# Patient Record
Sex: FEMALE | Race: WHITE | NOT HISPANIC OR LATINO | Employment: UNEMPLOYED | URBAN - METROPOLITAN AREA
[De-identification: names, ages, dates, MRNs, and addresses within clinical notes are randomized per-mention and may not be internally consistent; named-entity substitution may affect disease eponyms.]

---

## 2023-03-02 ENCOUNTER — MEDICAL CORRESPONDENCE (OUTPATIENT)
Dept: HEALTH INFORMATION MANAGEMENT | Facility: CLINIC | Age: 28
End: 2023-03-02
Payer: COMMERCIAL

## 2023-03-25 ENCOUNTER — TRANSCRIBE ORDERS (OUTPATIENT)
Dept: OTHER | Age: 28
End: 2023-03-25

## 2023-03-25 DIAGNOSIS — M25.562 CHRONIC PAIN OF LEFT KNEE: Primary | ICD-10-CM

## 2023-03-25 DIAGNOSIS — Z87.39 STATUS POST RECONSTRUCTION OF MEDIAL PATELLOFEMORAL LIGAMENT: ICD-10-CM

## 2023-03-25 DIAGNOSIS — G89.29 CHRONIC PAIN OF LEFT KNEE: Primary | ICD-10-CM

## 2023-03-25 DIAGNOSIS — Z98.890 STATUS POST RECONSTRUCTION OF MEDIAL PATELLOFEMORAL LIGAMENT: ICD-10-CM

## 2023-03-25 DIAGNOSIS — M25.562 PATELLOFEMORAL ARTHRALGIA OF LEFT KNEE: ICD-10-CM

## 2023-05-15 NOTE — TELEPHONE ENCOUNTER
Images Received    July 12, 2023 9:42 AM  [id number]   Facility  [facility]    Outcome Images requested on 5/16 are in PACS from both facilities - [facility]        Action May 16, 2023 2:15 PM MT   Action Taken Sent a request for imaging from [facility].     DIAGNOSIS: LT Knee   APPOINTMENT DATE: 05/23/2023   NOTES STATUS DETAILS   OFFICE NOTE from referring provider Care Everywhere 03/02/2023 - Sharad Tran - [facility]   OFFICE NOTE from other specialist Care Everywhere 02/19/2020 - Hang Powell MD - [facility]    12/27/2019 - Randell Allen MD - [facility]    More..     DISCHARGE REPORT from the ER Care Everywhere 11/06/2019 - [facility]   OPERATIVE REPORT Care Everywhere 08/05/2019 - Left Knee MPFL Reconstruction   MEDICATION LIST Internal  Care Everywhere    IMPLANT RECORD/STICKER Care Everywhere    LABS     MRI In process - received  [facility]:  02/27/2023, 07/02/2019, 02/05/2019, 12/07/2015 - LT Knee  11/15/2019 - LT Femur    [facility]:  12/20/2019 - LT Knee   XRAYS (IMAGES & REPORTS) In process - received  [facility]:  02/17/2023, 06/15/2020, 11/06/2019, 02/05/2019 - LT Knee  11/06/2019 - LT Hip  08/05/2019 - [facility]    [facility]:  10/29/2019 - LT Knee

## 2023-05-23 ENCOUNTER — PRE VISIT (OUTPATIENT)
Dept: ORTHOPEDICS | Facility: CLINIC | Age: 28
End: 2023-05-23

## 2023-07-14 DIAGNOSIS — M25.362 PATELLAR INSTABILITY OF LEFT KNEE: Primary | ICD-10-CM

## 2023-07-18 ENCOUNTER — OFFICE VISIT (OUTPATIENT)
Dept: ORTHOPEDICS | Facility: CLINIC | Age: 28
End: 2023-07-18
Payer: COMMERCIAL

## 2023-07-18 ENCOUNTER — ANCILLARY PROCEDURE (OUTPATIENT)
Dept: GENERAL RADIOLOGY | Facility: CLINIC | Age: 28
End: 2023-07-18
Attending: ORTHOPAEDIC SURGERY
Payer: COMMERCIAL

## 2023-07-18 VITALS — HEIGHT: 66 IN | WEIGHT: 293 LBS | BODY MASS INDEX: 47.09 KG/M2

## 2023-07-18 DIAGNOSIS — G89.29 CHRONIC PAIN OF LEFT KNEE: ICD-10-CM

## 2023-07-18 DIAGNOSIS — Z87.39 STATUS POST RECONSTRUCTION OF MEDIAL PATELLOFEMORAL LIGAMENT: ICD-10-CM

## 2023-07-18 DIAGNOSIS — M25.362 PATELLAR INSTABILITY OF LEFT KNEE: ICD-10-CM

## 2023-07-18 DIAGNOSIS — Z98.890 STATUS POST RECONSTRUCTION OF MEDIAL PATELLOFEMORAL LIGAMENT: ICD-10-CM

## 2023-07-18 DIAGNOSIS — M25.562 PATELLOFEMORAL ARTHRALGIA OF LEFT KNEE: Primary | ICD-10-CM

## 2023-07-18 DIAGNOSIS — M25.562 CHRONIC PAIN OF LEFT KNEE: ICD-10-CM

## 2023-07-18 PROCEDURE — 73560 X-RAY EXAM OF KNEE 1 OR 2: CPT | Mod: LT | Performed by: RADIOLOGY

## 2023-07-18 PROCEDURE — 77073 BONE LENGTH STUDIES: CPT | Performed by: STUDENT IN AN ORGANIZED HEALTH CARE EDUCATION/TRAINING PROGRAM

## 2023-07-18 PROCEDURE — 99203 OFFICE O/P NEW LOW 30 MIN: CPT | Mod: 25 | Performed by: ORTHOPAEDIC SURGERY

## 2023-07-18 PROCEDURE — 20610 DRAIN/INJ JOINT/BURSA W/O US: CPT | Mod: LT | Performed by: ORTHOPAEDIC SURGERY

## 2023-07-18 RX ORDER — SUMATRIPTAN 25 MG/1
TABLET, FILM COATED ORAL
COMMUNITY
Start: 2023-04-13

## 2023-07-18 RX ORDER — IBUPROFEN 600 MG/1
TABLET, FILM COATED ORAL
COMMUNITY
Start: 2023-04-14

## 2023-07-18 RX ORDER — DEXTROAMPHETAMINE SACCHARATE, AMPHETAMINE ASPARTATE, DEXTROAMPHETAMINE SULFATE AND AMPHETAMINE SULFATE 5; 5; 5; 5 MG/1; MG/1; MG/1; MG/1
1 TABLET ORAL
COMMUNITY
Start: 2023-07-15

## 2023-07-18 RX ORDER — OXYCODONE HYDROCHLORIDE 10 MG/1
TABLET ORAL
COMMUNITY
Start: 2023-07-14

## 2023-07-18 RX ORDER — BACLOFEN 10 MG/1
TABLET ORAL
COMMUNITY
Start: 2022-10-28

## 2023-07-18 RX ADMIN — LIDOCAINE HYDROCHLORIDE 9 ML: 10 INJECTION, SOLUTION EPIDURAL; INFILTRATION; INTRACAUDAL; PERINEURAL at 13:31

## 2023-07-18 RX ADMIN — TRIAMCINOLONE ACETONIDE 40 MG: 40 INJECTION, SUSPENSION INTRA-ARTICULAR; INTRAMUSCULAR at 13:31

## 2023-07-18 ASSESSMENT — ENCOUNTER SYMPTOMS
ARTHRALGIAS: 1
DEPRESSION: 1
JOINT SWELLING: 1
DECREASED CONCENTRATION: 1
NERVOUS/ANXIOUS: 1
INSOMNIA: 0
PANIC: 1
STIFFNESS: 1
MUSCLE CRAMPS: 1
MUSCLE WEAKNESS: 1
NECK PAIN: 1
MYALGIAS: 1
BACK PAIN: 1

## 2023-07-18 NOTE — LETTER
2023         RE: Joanne Phillips  [address] [facility] [address]        Dear Colleague,    Thank you for referring your patient, Joanne Phillips, to the [facility]. Please see a copy of my visit note below.    This is 1 Large Joint Injection/Arthocentesis: L knee joint    Date/Time: 2023 1:31 PM    Performed by: Dana Pelayo MD  Authorized by: Dana Pelayo MD    Indications:  Pain and osteoarthritis  Needle Size:  21 G  Guidance: landmark guided    Approach:  Medial  Location:  Knee      Medications:  40 mg triamcinolone 40 MG/ML; 9 mL lidocaine (PF) 1 %  Outcome:  Tolerated well, no immediate complications  Procedure discussed: discussed risks, benefits, and alternatives    Consent Given by:  Patient  Timeout: timeout called immediately prior to procedure    Prep: patient was prepped and draped in usual sterile fashion     HISTORY OF PRESENT ILLNESS:  Joanne Phillips is a 27 year old female with knee pain.  Diagnosis is knee arthritis supported by history, physical exam, and imaging.    PROCEDURE:  After informed verbal and writtten consent, under sterile conditions, patient's left knee was injected with 9 cc of Lidocaine and 1 cc of Kenalog (40 mg/ml).   The injection was done by Dr. Pelayo.    Patient had good pain relief upon leaving the clinic, and will follow up with us on an as needed basis.        [facility] [address] [id number] [phone]  Dept: [phone]  ______________________________________________________________________________    Patient: Joanne Phillips   : 1995   MRN: [id number]   2023    INVASIVE PROCEDURE SAFETY CHECKLIST    Date: 2023   Procedure:left knee steroid knee injection  Patient Name: Joanne Phillips  MRN: [id number]  YOB: 1995    Action: Complete sections as appropriate. Any discrepancy  results in a HARD COPY until resolved.     PRE PROCEDURE:  Patient ID verified with 2 identifiers (name and  or MRN): Yes  Procedure and site verified with patient/designee (when able): Yes  Accurate consent documentation in medical record: Yes  H&P (or appropriate assessment) documented in medical record: NA  H&P must be up to 20 days prior to procedure and updates within 24 hours of procedure as applicable: NA  Relevant diagnostic and radiology test results appropriately labeled and displayed as applicable: Yes  Procedure site(s) marked with provider initials: NA    TIMEOUT:  Time-Out performed immediately prior to starting procedure, including verbal and active participation of all team members addressing the following:Yes  * Correct patient identify  * Confirmed that the correct side and site are marked  * An accurate procedure consent form  * Agreement on the procedure to be done  * Correct patient position  * Relevant images and results are properly labeled and appropriately displayed  * The need to administer antibiotics or fluids for irrigation purposes during the procedure as applicable   * Safety precautions based on patient history or medication use    DURING PROCEDURE: Verification of correct person, site, and procedures any time the responsibility for care of the patient is transferred to another member of the care team.       The following medications were given:         Prior to injection, verified patient identity using patient's name and date of birth.  Due to injection administration, patient instructed to remain in clinic for 15 minutes  afterwards, and to report any adverse reaction to me immediately.    Joint injection was performed.    Medication Name: Lidocaine NDC 85869-505-37  Drug Amount Wasted:  Yes: 11 mg/ml   Vial/Syringe: Single dose vial  Expiration Date:  2025    Medication Name Kenlog NDC [address]-2    Scribed by Maggie Griggs ATC for Dr. Pelayo on 2023 at 1:48p based  on the provider's statements to me.     MALENA Veloz MD  Professor Orthopedic Surgery  [facility]          Patient is a 27-year-old female who presents with a primary diagnosis of left knee pain.  She referred by Carlos Tran PA-C.  She is present with her 2 children.    She gives a history of having recurrent patella instability episodes.  She was treated by Dr. Triston Lopez at [facility].    DOS 8/5/219: MPFL reconstruction with patella chondroplasty.      She was doing reasonably well until November 2022 when she was running and tripped up on a snack in the rug.  She hit her left ankle and hip.  She has had pain and a sense of instability in her knee since that time.    She lives with her 2 children and .  She does not work but is going to school for [employer].  She has had significant weight gain in the recent past but has always been a large woman.  She estimates her lowest weight as an adult to being approximately 230, when she started the current bariatric program she was 308 pounds.  She is [place] in the [employer] system to have bariatric surgery.  For this she has to have several sessions with the dietitian with whom she meets monthly.  She has 2 more visits to go before decision is made as to what is the next step.    She is here for further discussion in regards to her left knee.    Physical exam reveals a woman of large body size, BMI compute to 48    Examination of patient's left knee reveals well-healed surgical incisions.  Range of motion 0-1 20.  Kneecap tracks well through an active arc of motion.  Mild crepitus in early flexion.  No significant swelling or synovitis.  Passive patella mobility 2 quadrants lateral mobility with a firm endpoint.    Hips have satisfactory ROM without pain, ER > IR.    Select imaging was reviewed.    Long-leg alignment views reveal mild valgus alignment left greater than right at 2 and 2.5 degrees.    PA view  was taken today which show satisfactory maintenance of cartilage space.  MRI taken in February 2023 shows mild cartilage signal changes at the median ridge with no significant cartilage loss.  Lateral patella tilt 18.7 degrees  LTI 2.2 degrees.  No patella ebonie    Impression: Early patellofemoral arthrosis in the face of satisfactory patella stability.    Plan: 1. continue with the plan for consideration of bariatric surgery which is currently being monitored through [facility].  2.  Left knee injection today  3.  Consideration for PT for lateral to medial tape technique.  This latter is difficult given her location and her current family and schooling situation.  However this needs to be considered for the future in regards to helping confirm the diagnosis of lateral patella overload syndrome.    Injection be separately dictated.  All questions were answered.    Dana Pelayo MD  Professor Orthopedic Surgery  [facility]       Answers submitted by the patient for this visit:  Symptoms you have experienced in the last 30 days (Submitted on 7/18/2023)  General Symptoms: No  Skin Symptoms: No  HENT Symptoms: No  EYE SYMPTOMS: No  HEART SYMPTOMS: No  LUNG SYMPTOMS: No  INTESTINAL SYMPTOMS: No  URINARY SYMPTOMS: No  GYNECOLOGIC SYMPTOMS: No  BREAST SYMPTOMS: No  SKELETAL SYMPTOMS: Yes  BLOOD SYMPTOMS: No  NERVOUS SYSTEM SYMPTOMS: No  MENTAL HEALTH SYMPTOMS: Yes  Please answer the questions below to tell us what condition you are experiencing: (Submitted on 7/18/2023)  Back pain: Yes  Muscle aches: Yes  Neck pain: Yes  Swollen joints: Yes  Joint pain: Yes  Bone pain: Yes  Muscle cramps: Yes  Muscle weakness: Yes  Joint stiffness: Yes  Bone fracture: No  Please answer the questions below to tell us what condition you are experiencing: (Submitted on 7/18/2023)  Nervous or Anxious: Yes  Depression: Yes  Trouble sleeping: No  Trouble thinking or concentrating: Yes  Mood changes: Yes  Panic attacks: Yes

## 2023-07-18 NOTE — PROGRESS NOTES
This is 1 Large Joint Injection/Arthocentesis: L knee joint    Date/Time: 2023 1:31 PM    Performed by: Dana Pelayo MD  Authorized by: Dana Pelayo MD    Indications:  Pain and osteoarthritis  Needle Size:  21 G  Guidance: landmark guided    Approach:  Medial  Location:  Knee      Medications:  40 mg triamcinolone 40 MG/ML; 9 mL lidocaine (PF) 1 %  Outcome:  Tolerated well, no immediate complications  Procedure discussed: discussed risks, benefits, and alternatives    Consent Given by:  Patient  Timeout: timeout called immediately prior to procedure    Prep: patient was prepped and draped in usual sterile fashion     HISTORY OF PRESENT ILLNESS:  Joanne Phillips is a 27 year old female with knee pain.  Diagnosis is knee arthritis supported by history, physical exam, and imaging.    PROCEDURE:  After informed verbal and writtten consent, under sterile conditions, patient's left knee was injected with 9 cc of Lidocaine and 1 cc of Kenalog (40 mg/ml).   The injection was done by Dr. Pelayo.    Patient had good pain relief upon leaving the clinic, and will follow up with us on an as needed basis.        [facility] [address] [id number] [phone]  Dept: [phone]  ______________________________________________________________________________    Patient: Joanne Phillips   : 1995   MRN: [id number]   2023    INVASIVE PROCEDURE SAFETY CHECKLIST    Date: 2023   Procedure:left knee steroid knee injection  Patient Name: Joanne Phillips  MRN: [id number]  YOB: 1995    Action: Complete sections as appropriate. Any discrepancy results in a HARD COPY until resolved.     PRE PROCEDURE:  Patient ID verified with 2 identifiers (name and  or MRN): Yes  Procedure and site verified with patient/designee (when able): Yes  Accurate consent documentation in medical record: Yes  H&P (or appropriate  assessment) documented in medical record: NA  H&P must be up to 20 days prior to procedure and updates within 24 hours of procedure as applicable: NA  Relevant diagnostic and radiology test results appropriately labeled and displayed as applicable: Yes  Procedure site(s) marked with provider initials: NA    TIMEOUT:  Time-Out performed immediately prior to starting procedure, including verbal and active participation of all team members addressing the following:Yes  * Correct patient identify  * Confirmed that the correct side and site are marked  * An accurate procedure consent form  * Agreement on the procedure to be done  * Correct patient position  * Relevant images and results are properly labeled and appropriately displayed  * The need to administer antibiotics or fluids for irrigation purposes during the procedure as applicable   * Safety precautions based on patient history or medication use    DURING PROCEDURE: Verification of correct person, site, and procedures any time the responsibility for care of the patient is transferred to another member of the care team.       The following medications were given:         Prior to injection, verified patient identity using patient's name and date of birth.  Due to injection administration, patient instructed to remain in clinic for 15 minutes  afterwards, and to report any adverse reaction to me immediately.    Joint injection was performed.    Medication Name: Lidocaine NDC 60367-152-96  Drug Amount Wasted:  Yes: 11 mg/ml   Vial/Syringe: Single dose vial  Expiration Date:  1/2025    Medication Name Kenlog NDC [address]-4    Scribed by Maggie Griggs ATC for Dr. Pelayo on July 18, 2023 at 1:48p based on the provider's statements to me.     MALENA Veloz MD  Professor Orthopedic Surgery  [facility]

## 2023-07-18 NOTE — NURSING NOTE
"Reason For Visit:   Chief Complaint   Patient presents with     Consult     Patellofemoral arthralgia left knee / Sharad Tran PA-C       Primary MD: No primary care provider on file.  Referring MD: Sharad Tran    ?  No  Occupation [employer] - [employer].  Currently working? No.    Date of injury: 2019  Type of injury: she was standing and her leg went out and she fell down. Has many previous dislocations before surgery.  2 months after her surgery she fell and slipped on ice.  November of 2022 was running and her foot got caught on the rug and her left ankle touched her hip.  Has felt that she had instability events after surgery  Date of surgery: 8/5/2019 with Dr. Triston Lopez at [facility]  Type of surgery:   1. Diagnostic arthroscopy with chondroplasty of the patella.  2. MPFL reconstruction with allograft.    Smoker: No  Request smoking cessation information: No    Ht 1.686 m (5' 6.38\")   Wt 136 kg (299 lb 12.8 oz)   BMI 47.84 kg/m      Pain Assessment  Patient Currently in Pain: Yes  Primary Pain Location: Knee (Left)  Pain Descriptors: Aching  Aggravating Factors: Other (comment) (worse at night)    "

## 2023-07-25 RX ORDER — TRIAMCINOLONE ACETONIDE 40 MG/ML
40 INJECTION, SUSPENSION INTRA-ARTICULAR; INTRAMUSCULAR
Status: SHIPPED | OUTPATIENT
Start: 2023-07-18

## 2023-07-25 RX ORDER — LIDOCAINE HYDROCHLORIDE 10 MG/ML
9 INJECTION, SOLUTION EPIDURAL; INFILTRATION; INTRACAUDAL; PERINEURAL
Status: SHIPPED | OUTPATIENT
Start: 2023-07-18

## 2023-07-25 NOTE — PROGRESS NOTES
Patient is a 27-year-old female who presents with a primary diagnosis of left knee pain.  She referred by Carlos Tran PA-C.  She is present with her 2 children.    She gives a history of having recurrent patella instability episodes.  She was treated by Dr. Triston Lopez at [facility].    DOS 8/5/219: MPFL reconstruction with patella chondroplasty.      She was doing reasonably well until November 2022 when she was running and tripped up on a snack in the rug.  She hit her left ankle and hip.  She has had pain and a sense of instability in her knee since that time.    She lives with her 2 children and .  She does not work but is going to school for [employer].  She has had significant weight gain in the recent past but has always been a large woman.  She estimates her lowest weight as an adult to being approximately 230, when she started the current bariatric program she was 308 pounds.  She is currently in the [employer] system to have bariatric surgery.  For this she has to have several sessions with the dietitian with whom she meets monthly.  She has 2 more visits to go before decision is made as to what is the next step.    She is here for further discussion in regards to her left knee.    Physical exam reveals a woman of large body size, BMI compute to 48    Examination of patient's left knee reveals well-healed surgical incisions.  Range of motion 0-1 20.  Kneecap tracks well through an active arc of motion.  Mild crepitus in early flexion.  No significant swelling or synovitis.  Passive patella mobility 2 quadrants lateral mobility with a firm endpoint.    Hips have satisfactory ROM without pain, ER > IR.    Select imaging was reviewed.    Long-leg alignment views reveal mild valgus alignment left greater than right at 2 and 2.5 degrees.    PA view was taken today which show satisfactory maintenance of cartilage space.  MRI taken in February 2023 shows mild cartilage signal changes at the median  ridge with no significant cartilage loss.  Lateral patella tilt 18.7 degrees  LTI 2.2 degrees.  No patella ebonie    Impression: Early patellofemoral arthrosis in the face of satisfactory patella stability.    Plan: 1. continue with the plan for consideration of bariatric surgery which is currently being monitored through [facility].  2.  Left knee injection today  3.  Consideration for PT for lateral to medial tape technique.  This latter is difficult given her location and her current family and schooling situation.  However this needs to be considered for the future in regards to helping confirm the diagnosis of lateral patella overload syndrome.    Injection be separately dictated.  All questions were answered.    Dana Pelayo MD  Professor Orthopedic Surgery  [facility]       Answers submitted by the patient for this visit:  Symptoms you have experienced in the last 30 days (Submitted on 7/18/2023)  General Symptoms: No  Skin Symptoms: No  HENT Symptoms: No  EYE SYMPTOMS: No  HEART SYMPTOMS: No  LUNG SYMPTOMS: No  INTESTINAL SYMPTOMS: No  URINARY SYMPTOMS: No  GYNECOLOGIC SYMPTOMS: No  BREAST SYMPTOMS: No  SKELETAL SYMPTOMS: Yes  BLOOD SYMPTOMS: No  NERVOUS SYSTEM SYMPTOMS: No  MENTAL HEALTH SYMPTOMS: Yes  Please answer the questions below to tell us what condition you are experiencing: (Submitted on 7/18/2023)  Back pain: Yes  Muscle aches: Yes  Neck pain: Yes  Swollen joints: Yes  Joint pain: Yes  Bone pain: Yes  Muscle cramps: Yes  Muscle weakness: Yes  Joint stiffness: Yes  Bone fracture: No  Please answer the questions below to tell us what condition you are experiencing: (Submitted on 7/18/2023)  Nervous or Anxious: Yes  Depression: Yes  Trouble sleeping: No  Trouble thinking or concentrating: Yes  Mood changes: Yes  Panic attacks: Yes

## (undated) RX ORDER — TRIAMCINOLONE ACETONIDE 40 MG/ML
INJECTION, SUSPENSION INTRA-ARTICULAR; INTRAMUSCULAR
Status: DISPENSED
Start: 2023-07-18

## (undated) RX ORDER — LIDOCAINE HYDROCHLORIDE 10 MG/ML
INJECTION, SOLUTION INFILTRATION; PERINEURAL
Status: DISPENSED
Start: 2023-07-18